# Patient Record
Sex: FEMALE | Race: BLACK OR AFRICAN AMERICAN
[De-identification: names, ages, dates, MRNs, and addresses within clinical notes are randomized per-mention and may not be internally consistent; named-entity substitution may affect disease eponyms.]

---

## 2018-04-28 NOTE — NUR
per pt states someone will come and pick her and her car up. ER MD made aware, 
requested for change pain medication.

## 2018-04-28 NOTE — NUR
Patient discharged to home in stable conditon.  Written and verbal after care 
instructions given with prescription. 

Patient verbalizes understanding of instructions.

## 2018-04-28 NOTE — NUR
pt comes in w/ c/o generalized body pain. pt is aox4, able to speak in clear 
and complete sentences and make needs known. pt in no acute distress. breathes 
are clear and equal. awaiting orders.

## 2018-07-01 NOTE — NUR
Patient discharged to home in stable conditon.  Written and verbal after care 
instructions given. 

Patient verbalizes understanding of instructions.

Pt left ER w/ steady gait.

## 2018-09-25 ENCOUNTER — HOSPITAL ENCOUNTER (INPATIENT)
Dept: HOSPITAL 12 - ER | Age: 33
LOS: 2 days | Discharge: HOME | DRG: 546 | End: 2018-09-27
Payer: SELF-PAY

## 2018-09-25 VITALS — WEIGHT: 107.19 LBS | BODY MASS INDEX: 18.99 KG/M2 | HEIGHT: 63 IN

## 2018-09-25 DIAGNOSIS — Z87.01: ICD-10-CM

## 2018-09-25 DIAGNOSIS — D64.9: ICD-10-CM

## 2018-09-25 DIAGNOSIS — D61.818: ICD-10-CM

## 2018-09-25 DIAGNOSIS — E87.6: ICD-10-CM

## 2018-09-25 DIAGNOSIS — Z91.040: ICD-10-CM

## 2018-09-25 DIAGNOSIS — E44.1: ICD-10-CM

## 2018-09-25 DIAGNOSIS — Z86.711: ICD-10-CM

## 2018-09-25 DIAGNOSIS — M32.9: Primary | ICD-10-CM

## 2018-09-25 PROCEDURE — A9150 MISC/EXPER NON-PRESCRIPT DRU: HCPCS

## 2018-09-25 PROCEDURE — A4663 DIALYSIS BLOOD PRESSURE CUFF: HCPCS

## 2018-09-25 NOTE — NUR
PT A/OX4, RESPONSIVE TO VERBAL AND TACTILE STIMULI. PT SELF-AMBULATED WITHOUT 
DIFFICULTY. PT C/O BILATERAL LE PAIN X 2 DAYS. PATIENT HAS HX OF LUPUS AND 
BELIEVES THE PAIN IS RELATED TO THIS CONDITION. PAIN STARTED 2 DAYS AGO, NO 
PROVOKING FACTOR, ACHING PAIN, DOES NOT RADIATE, 7/10. 



ON EVALUATION: PT NOTED WITH PATCH OF REDNESS ON L ORANTES AND R ANKLE. WARM TO 
THE TOUCH, BUT NO EDEMA NOTED. PATIENT IS NOTED WITH BLE BEING WARM TO THE 
TOUCH AND ENDORSES PAIN WITH AMBULATION.



PT DENIES C/P, SOB, N/V, NO FEVER COMPLAINTS.



BED IN LOW AND LOCKED POSITION WITH BILATERAL UPPER SIDERAILS UP.

## 2018-09-25 NOTE — NUR
PT A/OX4, RESPONSIVE TO VERBAL AND TACTILE STIMULI. PT SELF-AMBULATED WITHOUT 
DIFFICULTY. PT C/O BILATERAL LE PAIN RELATED TO HX OF LUPUS. PAIN STARTED 2 
DAYS AGO, NO PROVOKING FACTOR, ACHING PAIN, DOES NO RADIATE, 7/10. PT DENIES 
C/P, SOB, N/V. 



BED IN LOW AND LOCKED POSITION WITH BILATERAL UPPER SIDERAILS UP.

## 2018-09-26 VITALS — SYSTOLIC BLOOD PRESSURE: 116 MMHG | DIASTOLIC BLOOD PRESSURE: 74 MMHG

## 2018-09-26 VITALS — DIASTOLIC BLOOD PRESSURE: 80 MMHG | SYSTOLIC BLOOD PRESSURE: 124 MMHG

## 2018-09-26 VITALS — SYSTOLIC BLOOD PRESSURE: 127 MMHG | DIASTOLIC BLOOD PRESSURE: 88 MMHG

## 2018-09-26 VITALS — DIASTOLIC BLOOD PRESSURE: 76 MMHG | SYSTOLIC BLOOD PRESSURE: 116 MMHG

## 2018-09-26 LAB
ALP SERPL-CCNC: 62 U/L (ref 50–136)
ALT SERPL W/O P-5'-P-CCNC: 13 U/L (ref 14–59)
APPEARANCE UR: CLEAR
AST SERPL-CCNC: 14 U/L (ref 15–37)
BASOPHILS # BLD AUTO: 0 K/UL (ref 0–8)
BASOPHILS NFR BLD AUTO: 0.3 % (ref 0–2)
BILIRUB SERPL-MCNC: 0.5 MG/DL (ref 0.2–1)
BILIRUB UR QL STRIP: NEGATIVE
BUN SERPL-MCNC: 5 MG/DL (ref 7–18)
CHLORIDE SERPL-SCNC: 101 MMOL/L (ref 98–107)
CK SERPL-CCNC: 61 U/L (ref 26–192)
CO2 SERPL-SCNC: 28 MMOL/L (ref 21–32)
COLOR UR: (no result)
CREAT SERPL-MCNC: 0.6 MG/DL (ref 0.6–1.3)
DEPRECATED SQUAMOUS URNS QL MICRO: (no result) /HPF
EOSINOPHIL # BLD AUTO: 0 K/UL (ref 0–0.7)
EOSINOPHIL NFR BLD AUTO: 1 % (ref 0–7)
GLUCOSE SERPL-MCNC: 96 MG/DL (ref 74–106)
GLUCOSE UR STRIP-MCNC: NEGATIVE MG/DL
HCG UR QL: NEGATIVE
HCT VFR BLD AUTO: 31.6 % (ref 31.2–41.9)
HGB BLD-MCNC: 10.6 G/DL (ref 10.9–14.3)
KETONES UR STRIP-MCNC: NEGATIVE MG/DL
LEUKOCYTE ESTERASE UR QL STRIP: NEGATIVE
LYMPHOCYTES # BLD AUTO: 0.5 K/UL (ref 20–40)
LYMPHOCYTES NFR BLD AUTO: 14.9 % (ref 20.5–51.5)
MCH RBC QN AUTO: 27.5 UUG (ref 24.7–32.8)
MCHC RBC AUTO-ENTMCNC: 34 G/DL (ref 32.3–35.6)
MCV RBC AUTO: 81.9 FL (ref 75.5–95.3)
MONOCYTES # BLD AUTO: 0.2 K/UL (ref 2–10)
MONOCYTES NFR BLD AUTO: 5.1 % (ref 0–11)
NEUTROPHILS # BLD AUTO: 2.8 K/UL (ref 1.8–8.9)
NEUTROPHILS NFR BLD AUTO: 78.7 % (ref 38.5–71.5)
NITRITE UR QL STRIP: NEGATIVE
PH UR STRIP: 7 [PH] (ref 5–8)
PLATELET # BLD AUTO: 166 K/UL (ref 179–408)
POTASSIUM SERPL-SCNC: 3.2 MMOL/L (ref 3.5–5.1)
PROT UR QL STRIP: NEGATIVE
RBC # BLD AUTO: 3.86 MIL/UL (ref 3.63–4.92)
RBC #/AREA URNS HPF: (no result) /HPF (ref 0–3)
SP GR UR STRIP: 1.01 (ref 1–1.03)
UROBILINOGEN UR STRIP-MCNC: 0.2 E.U./DL
WBC # BLD AUTO: 3.5 K/UL (ref 3.8–11.8)
WBC #/AREA URNS HPF: (no result) /HPF
WBC #/AREA URNS HPF: (no result) /HPF (ref 0–3)
WS STN SPEC: 8 G/DL (ref 6.4–8.2)

## 2018-09-26 RX ADMIN — HYDROXYCHLOROQUINE SULFATE SCH MG: 200 TABLET, FILM COATED ORAL at 20:32

## 2018-09-26 RX ADMIN — MORPHINE SULFATE PRN MG: 4 INJECTION, SOLUTION INTRAMUSCULAR; INTRAVENOUS at 20:16

## 2018-09-26 RX ADMIN — MORPHINE SULFATE PRN MG: 4 INJECTION, SOLUTION INTRAMUSCULAR; INTRAVENOUS at 12:39

## 2018-09-26 RX ADMIN — HYDROXYCHLOROQUINE SULFATE SCH MG: 200 TABLET, FILM COATED ORAL at 12:39

## 2018-09-26 NOTE — NUR
RECEIVED PT AWAKE, ALERT, ORIENTEDX4. PT SHOWS NO SIGNS OF DISTRESS. IV INTACT. CALL LIGHT 
WITHIN REACH. SAFETY AND COMFORT PROVIDED. WILL CONTINUE TO MONITOR.

## 2018-09-26 NOTE — NUR
RECEIVED REPORT FROM INITIAL PRIMARY CARE NURSE AT THIS TIME. PT NOTED TO HAVE PAIN RELIEF 
FROM MORPHINE IV AT THIS TIME.  NO S/S OF ACUTE DISTRESS. SAFE ENVIRONMENT IMPLEMENTED. CALL 
LIGHT WITHIN REACH.

## 2018-09-26 NOTE — NUR
Pt complaining of left lower extremity pain, noted to be swelling with redness and warm to 
touch. Dr Bey paged and received orders for US of LLE to rule out DVT.

## 2018-09-26 NOTE — NUR
Pt made aware of all plan of care. No s/s of acute distress noted at this time. Pain  
management administered as ordered. Safe environment implemented. Call light within reach.

## 2018-09-26 NOTE — NUR
End of shift note:

Patient is alert and oriented x4, in no acute distress. medicated for pain as needed and was 
effective. Assisted to the bathroom as needed. No acute change of condition noted. All needs 
attended and met. Will continue to monitor

## 2018-09-26 NOTE — NUR
Pt. admitted to Telemetry  , under care of Dr. Bey. Patient going to 
telemetry bed, room 221

Belongs List completed

## 2018-09-26 NOTE — NUR
Received pt in unit at 0235 under care of Dr. Bey. Pt noted to be alert and oriented x 
3, denies difficulty breathing. Pertinent assessments done. Pt afebrile. Unit orientation 
provided. Safe environment implemented. Call light within reach.

## 2018-09-26 NOTE — NUR
ER MD at bedside for patient update. Plan of care reviewed with patient at this 
time. All questions/concerns answered by MD.

## 2018-09-26 NOTE — NUR
HANDSOFF REPORT TO RENY VELASQUEZ. PT SHOWS NO SIGNS OF DISTRESS. PT GIVEN PAIN MEDICATION FOR 
GENERALIZED PAIN. PT VITAL SIGNS STABLE AND WITHIN NORMAL LIMIT.  CALL LIGHT WITHIN REACH. 
SAFETY AND COMFORT PROVIDED. WILL CONTINUE TO MONITOR.

## 2018-09-27 VITALS — DIASTOLIC BLOOD PRESSURE: 66 MMHG | SYSTOLIC BLOOD PRESSURE: 106 MMHG

## 2018-09-27 VITALS — DIASTOLIC BLOOD PRESSURE: 59 MMHG | SYSTOLIC BLOOD PRESSURE: 100 MMHG

## 2018-09-27 LAB
ALP SERPL-CCNC: 56 U/L (ref 50–136)
ALT SERPL W/O P-5'-P-CCNC: 14 U/L (ref 14–59)
AST SERPL-CCNC: 16 U/L (ref 15–37)
BASOPHILS # BLD AUTO: 0 K/UL (ref 0–8)
BASOPHILS NFR BLD AUTO: 0.3 % (ref 0–2)
BILIRUB SERPL-MCNC: 0.4 MG/DL (ref 0.2–1)
BUN SERPL-MCNC: 7 MG/DL (ref 7–18)
CHLORIDE SERPL-SCNC: 107 MMOL/L (ref 98–107)
CHOLEST SERPL-MCNC: 146 MG/DL (ref ?–200)
CO2 SERPL-SCNC: 27 MMOL/L (ref 21–32)
CREAT SERPL-MCNC: 0.5 MG/DL (ref 0.6–1.3)
EOSINOPHIL # BLD AUTO: 0 K/UL (ref 0–0.7)
EOSINOPHIL NFR BLD AUTO: 0.8 % (ref 0–7)
GLUCOSE SERPL-MCNC: 87 MG/DL (ref 74–106)
HCT VFR BLD AUTO: 31.2 % (ref 31.2–41.9)
HDLC SERPL-MCNC: 34 MG/DL (ref 40–60)
HGB BLD-MCNC: 10.5 G/DL (ref 10.9–14.3)
LYMPHOCYTES # BLD AUTO: 0.8 K/UL (ref 20–40)
LYMPHOCYTES NFR BLD AUTO: 26.4 % (ref 20.5–51.5)
MAGNESIUM SERPL-MCNC: 2.1 MG/DL (ref 1.8–2.4)
MCH RBC QN AUTO: 27.6 UUG (ref 24.7–32.8)
MCHC RBC AUTO-ENTMCNC: 34 G/DL (ref 32.3–35.6)
MCV RBC AUTO: 82.1 FL (ref 75.5–95.3)
MONOCYTES # BLD AUTO: 0.4 K/UL (ref 2–10)
MONOCYTES NFR BLD AUTO: 11.7 % (ref 0–11)
NEUTROPHILS # BLD AUTO: 1.9 K/UL (ref 1.8–8.9)
NEUTROPHILS NFR BLD AUTO: 60.8 % (ref 38.5–71.5)
PHOSPHATE SERPL-MCNC: 3 MG/DL (ref 2.5–4.9)
PLATELET # BLD AUTO: 137 K/UL (ref 179–408)
POTASSIUM SERPL-SCNC: 3.6 MMOL/L (ref 3.5–5.1)
RBC # BLD AUTO: 3.8 MIL/UL (ref 3.63–4.92)
TRIGL SERPL-MCNC: 89 MG/DL (ref 30–150)
WBC # BLD AUTO: 3.1 K/UL (ref 3.8–11.8)
WS STN SPEC: 7.6 G/DL (ref 6.4–8.2)

## 2018-09-27 RX ADMIN — MORPHINE SULFATE PRN MG: 4 INJECTION, SOLUTION INTRAMUSCULAR; INTRAVENOUS at 00:33

## 2018-09-27 RX ADMIN — HYDROXYCHLOROQUINE SULFATE SCH MG: 200 TABLET, FILM COATED ORAL at 09:09

## 2018-09-27 RX ADMIN — MORPHINE SULFATE PRN MG: 4 INJECTION, SOLUTION INTRAMUSCULAR; INTRAVENOUS at 10:18

## 2018-09-27 NOTE — NUR
discharge noted. patient agreeable with plan. discharge protocol followed, iv removed with 
no redness or irritation. all belongings accounted for and sent with patient, prescription 
given to patient. patient did not want wheelchair, ambulated with myself down to private car 
with [employer]